# Patient Record
Sex: MALE | Race: BLACK OR AFRICAN AMERICAN | Employment: FULL TIME | URBAN - METROPOLITAN AREA
[De-identification: names, ages, dates, MRNs, and addresses within clinical notes are randomized per-mention and may not be internally consistent; named-entity substitution may affect disease eponyms.]

---

## 2024-02-26 ENCOUNTER — HOSPITAL ENCOUNTER (EMERGENCY)
Age: 29
Discharge: HOME | End: 2024-02-27
Attending: EMERGENCY MEDICINE
Payer: COMMERCIAL

## 2024-02-26 VITALS
SYSTOLIC BLOOD PRESSURE: 131 MMHG | DIASTOLIC BLOOD PRESSURE: 73 MMHG | BODY MASS INDEX: 23.48 KG/M2 | WEIGHT: 182.98 LBS | RESPIRATION RATE: 14 BRPM | HEART RATE: 71 BPM | OXYGEN SATURATION: 100 % | TEMPERATURE: 98.6 F | HEIGHT: 74 IN

## 2024-02-26 DIAGNOSIS — A64 STI (SEXUALLY TRANSMITTED INFECTION): Primary | ICD-10-CM

## 2024-02-26 LAB

## 2024-02-26 PROCEDURE — 81003 URINALYSIS AUTO W/O SCOPE: CPT

## 2024-02-26 PROCEDURE — 99284 EMERGENCY DEPT VISIT MOD MDM: CPT

## 2024-02-26 PROCEDURE — 87491 CHLMYD TRACH DNA AMP PROBE: CPT

## 2024-02-26 PROCEDURE — 87591 N.GONORRHOEAE DNA AMP PROB: CPT

## 2024-02-27 PROCEDURE — 6360000002 HC RX W HCPCS: Performed by: EMERGENCY MEDICINE

## 2024-02-27 PROCEDURE — 96372 THER/PROPH/DIAG INJ SC/IM: CPT

## 2024-02-27 PROCEDURE — 6370000000 HC RX 637 (ALT 250 FOR IP): Performed by: EMERGENCY MEDICINE

## 2024-02-27 RX ORDER — AZITHROMYCIN 500 MG/1
1000 TABLET, FILM COATED ORAL ONCE
Status: COMPLETED | OUTPATIENT
Start: 2024-02-27 | End: 2024-02-27

## 2024-02-27 RX ORDER — CEFTRIAXONE SODIUM 250 MG/1
250 INJECTION, POWDER, FOR SOLUTION INTRAMUSCULAR; INTRAVENOUS ONCE
Status: COMPLETED | OUTPATIENT
Start: 2024-02-27 | End: 2024-02-27

## 2024-02-27 RX ADMIN — CEFTRIAXONE SODIUM 250 MG: 250 INJECTION, POWDER, FOR SOLUTION INTRAMUSCULAR; INTRAVENOUS at 00:33

## 2024-02-27 RX ADMIN — AZITHROMYCIN 1000 MG: 500 TABLET, FILM COATED ORAL at 00:33

## 2024-02-27 ASSESSMENT — PAIN - FUNCTIONAL ASSESSMENT: PAIN_FUNCTIONAL_ASSESSMENT: NONE - DENIES PAIN

## 2024-02-27 NOTE — DISCHARGE INSTRUCTIONS
Please follow-up with your primary care physician to evaluate for the possibility of hepatitis, HIV, and other sexually transmitted infections, as well as routine health maintenance.

## 2024-02-27 NOTE — ED PROVIDER NOTES
[facility]  EMERGENCY DEPARTMENT ENCOUNTER      Pt Name: Meseret Aguirre  MRN: [id number]  Birthdate 1995  Date of evaluation: 2/26/2024  Provider: Marlyn Vo MD    CHIEF COMPLAINT       Chief Complaint   Patient presents with    Exposure to STD     Denies symptoms, requesting testing         HISTORY OF PRESENT ILLNESS   (Location/Symptom, Timing/Onset, Context/Setting, Quality, Duration, Modifying Factors, Severity)  Note limiting factors.   Meseret Aguirre is a 28 y.o. male who presents to the emergency department because he cheated on his girlfriend and is worried about an STI.  He denies penile discharge, dysuria, rash, and does not know if the other partner had any sexually transmitted infections.  He denies fevers, cough, abdominal pain.  He does have a prior history of chlamydia which he states was treated years ago.        Nursing Notes were reviewed.    REVIEW OF SYSTEMS    (2-9 systems for level 4, 10 or more for level 5)     As per HPI    Except as noted above the remainder of the review of systems was reviewed and negative.       PAST MEDICAL HISTORY   No past medical history on file.      SURGICAL HISTORY     No past surgical history on file.      CURRENT MEDICATIONS       Previous Medications    No medications on file       ALLERGIES     Patient has no known allergies.    FAMILY HISTORY     No family history on file.       SOCIAL HISTORY       Social History     Socioeconomic History    Marital status: Single       SCREENINGS         [place] Coma Scale  Eye Opening: Spontaneous  Best Verbal Response: Oriented  Best Motor Response: Obeys commands  [place] Coma Scale Score: 15                     CIWA Assessment  BP: 131/73  Pulse: 71                 PHYSICAL EXAM    (up to 7 for level 4, 8 or more for level 5)     ED Triage Vitals [02/26/24 2306]   BP Temp Temp Source Pulse Respirations SpO2 Height Weight - Scale   131/73 98.6 °F (37 °C) Oral 71 14 100 % 1.88 m (6' 2\")

## 2024-02-28 LAB
C TRACH DNA UR QL NAA+PROBE: NEGATIVE
N GONORRHOEA DNA UR QL NAA+PROBE: NEGATIVE